# Patient Record
Sex: MALE | Race: OTHER | NOT HISPANIC OR LATINO | URBAN - METROPOLITAN AREA
[De-identification: names, ages, dates, MRNs, and addresses within clinical notes are randomized per-mention and may not be internally consistent; named-entity substitution may affect disease eponyms.]

---

## 2019-09-28 ENCOUNTER — EMERGENCY (EMERGENCY)
Facility: HOSPITAL | Age: 29
LOS: 1 days | Discharge: HOME | End: 2019-09-28
Attending: EMERGENCY MEDICINE
Payer: MEDICAID

## 2019-09-28 VITALS
HEART RATE: 62 BPM | HEIGHT: 68 IN | RESPIRATION RATE: 16 BRPM | OXYGEN SATURATION: 100 % | WEIGHT: 149.91 LBS | TEMPERATURE: 98 F | SYSTOLIC BLOOD PRESSURE: 107 MMHG | DIASTOLIC BLOOD PRESSURE: 69 MMHG

## 2019-09-28 PROCEDURE — 99284 EMERGENCY DEPT VISIT MOD MDM: CPT

## 2019-09-28 PROCEDURE — 99285 EMERGENCY DEPT VISIT HI MDM: CPT

## 2019-09-28 RX ORDER — ONDANSETRON 8 MG/1
4 TABLET, FILM COATED ORAL ONCE
Refills: 0 | Status: COMPLETED | OUTPATIENT
Start: 2019-09-28 | End: 2019-09-28

## 2019-09-28 RX ADMIN — ONDANSETRON 4 MILLIGRAM(S): 8 TABLET, FILM COATED ORAL at 22:48

## 2019-09-28 NOTE — ED ADULT TRIAGE NOTE - CHIEF COMPLAINT QUOTE
+ etoh and marijuana. reports "think maybe I had a little to much. vomited a couple times." a and o x3. breathing even and unlabored. calm and cooperative.

## 2019-09-28 NOTE — ED PROVIDER NOTE - OBJECTIVE STATEMENT
30 y/o M presents to the ED c/o nausea and vomiting today. Pt reports that he was also smoking Marijuana, he became nauseous, and vomited a couple times. Still currently nauseous. Says that he does not usually drink EtOH and only had a glass and a half of "Gentleman's" today at "[facility] He normally does not smoke marijuana and drink EtOH together usually and has only recently started doing them together. Denies any physical pain. No further acute complaints at this time. 28 y/o M presents to the ED c/o nausea and vomiting today. Pt reports that he was also smoking Marijuana, he became nauseous, and vomited a couple times. Still currently nauseous. Says that he does not usually drink EtOH and only had a glass and a half of "[place]" today at "[facility]," but has drank more EtOH in the past. He normally does not smoke marijuana and drink EtOH together usually and has only recently started doing them together. Denies any physical pain. Notes that he booked a room at "[facility]" and was not planning on going home, but if he does, will likely call a cab. No further acute complaints at this time.

## 2019-09-28 NOTE — ED PROVIDER NOTE - CLINICAL SUMMARY MEDICAL DECISION MAKING FREE TEXT BOX
Patient with vomiting after drinking and using marijuana no other complaints.  Abdomen soft, non-tender.  Patient tolerating PO in the ER and was discharged.

## 2019-09-28 NOTE — ED PROVIDER NOTE - PATIENT PORTAL LINK FT
You can access the FollowMyHealth Patient Portal offered by [facility] by registering at the following website: [email or web address]. By joining [employer]’s FollowMyHealth portal, you will also be able to view your health information using other applications (apps) compatible with our system.

## 2019-09-29 VITALS
RESPIRATION RATE: 18 BRPM | SYSTOLIC BLOOD PRESSURE: 123 MMHG | HEART RATE: 81 BPM | DIASTOLIC BLOOD PRESSURE: 81 MMHG | TEMPERATURE: 98 F | OXYGEN SATURATION: 100 %